# Patient Record
Sex: FEMALE | Race: WHITE
[De-identification: names, ages, dates, MRNs, and addresses within clinical notes are randomized per-mention and may not be internally consistent; named-entity substitution may affect disease eponyms.]

---
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
## 2022-01-25 ENCOUNTER — HOSPITAL ENCOUNTER (EMERGENCY)
Dept: HOSPITAL 75 - ER | Age: 22
Discharge: HOME | End: 2022-01-25
Payer: MEDICAID

## 2022-01-25 VITALS — WEIGHT: 169.98 LBS | BODY MASS INDEX: 27.65 KG/M2 | HEIGHT: 65.75 IN

## 2022-01-25 VITALS — SYSTOLIC BLOOD PRESSURE: 117 MMHG | DIASTOLIC BLOOD PRESSURE: 82 MMHG

## 2022-01-25 DIAGNOSIS — S90.122A: Primary | ICD-10-CM
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
## 2022-01-25 PROCEDURE — 73630 X-RAY EXAM OF FOOT: CPT

## 2022-01-25 NOTE — DIAGNOSTIC IMAGING REPORT
INDICATION: Pain.



FINDINGS: Three views of the left fourth toe showed no fracture,

dislocation, gas, foreign body, or abnormal periosteal reaction.

In particular, the fourth toe appeared unremarkable.



IMPRESSION: Unremarkable three-view left foot.



Dictated by: 



  Dictated on workstation # [id number]

## 2022-01-25 NOTE — ED LOWER EXTREMITY
General


Chief Complaint:  Lower Extremity


Stated Complaint:  LEFT FOOT 4 TOE INJURY


Nursing Triage Note:  


PT AMBULATORY TO ER, REPORTS WAS WALKING YESTERDAY AND ACCIDENTALLY KICKED THE 


CORNER OF THE WALL WITH HER L FOOT. PT C/O PAIN TO L 4TH TOE, BRUISING AND 


SWELLING NOTED TO DORSAL ASPECT OF L FOOT.


Source:  patient


Exam Limitations:  no limitations





History of Present Illness


Date Seen by Provider:  2022


Time Seen by Provider:  17:33


Initial Comments


Patient is a 21-year-old female who presents ED with left fourth toe pain.  

Patient states she accidentally hit a wall yesterday.  She report immediate 

pain.  Pain hurt worse with walking or standing.  Denies taking thing for pain. 

Noted bruising this morning with continuous pain.  Pain located to the fourth 

proximal toe.  No obvious bone deformity.  No history of previous fracture.





Allergies and Home Medications


Allergies


Coded Allergies:  


     No Known Drug Allergies (Unverified , 10/13/18)





Patient Home Medication List


Home Medication List Reviewed:  Yes


Docusate Sodium (Dok) 100 Mg Capsule, 100 MG PO BID PRN for CONSTIPATION-1ST 

LINE


   Prescribed by: NILSON MEDINA on 10/26/20 1224


Hydrocodone/Acetaminophen (Hydrocodone-Acetamin 5-325 mg) 1 Each Tablet, 1-2 TAB

PO Q6HR PRN for PAIN-MODERATE (5-7)


   Prescribed by: NILSON MEDINA on 10/26/20 1224


Ibuprofen (Ibu) 600 Mg Tablet, 600 MG PO Q6HR


   Prescribed by: NILSON MEDINA on 10/26/20 1224


Prenatal Vit W-Ca,Fe,FA(<1 mg) (Prenatal Vitamins) 1 Each Tablet, 1 EACH PO 

DAILY, (Reported)


   Entered as Reported by: SHWETA SENA on 20 1621





Review of Systems


Constitutional:  No chills, No dizziness, No fever, No malaise


EENTM:  No eye pain, No mouth pain, No mouth swelling, No nose pain, No throat 

pain


Respiratory:  No cough, No dyspnea on exertion, No short of breath


Cardiovascular:  No chest pain


Gastrointestinal:  No abdominal pain, No diarrhea, No nausea, No vomiting


Genitourinary:  No decreased output, No discharge


Musculoskeletal:  No back pain, No joint pain


Skin:  change in color, rash





All Other Systems Reviewed


Negative Unless Noted:  Yes





Past Medical-Social-Family Hx


Patient Social History


Tobacco Use?:  No


Use of E-Cig and/or Vaping dev:  No


Substance use?:  No


Alcohol Use?:  No


Pt feels they are or have been:  No





Immunizations Up To Date


Tetanus Booster (TDap):  Less than 5yrs


PED Vaccines UTD:  Yes





Seasonal Allergies


Seasonal Allergies:  No





Past Medical History


Surgeries:  Yes


 Section


Respiratory:  No


Cardiac:  No


Neurological:  No


Last Menstrual Period:  Dec 5, 2021


Reproductive Disorders:  No


Genitourinary:  Yes


Kidney Stones


Gastrointestinal:  Yes (Cyst by liver/Gallbladder )


Pancreatitis


Musculoskeletal:  No


Endocrine:  No


HEENT:  No


Cancer:  No


Psychosocial:  Yes


Anxiety


Integumentary:  No


Blood Disorders:  No


Adverse Reaction/Blood Tranf:  No





Family Medical History





Patient reports no known family medical history.


No Pertinent Family Hx, Heart Disease, Cancer, Diabetes





Physical Exam


Vital Signs





Vital Signs - First Documented








 22





 17:23


 


Temp 35.4


 


Pulse 80


 


Resp 20


 


B/P (MAP) 129/86 (100)


 


Pulse Ox 95


 


O2 Delivery Room Air





Capillary Refill :


Height, Weight, BMI


Height: 5'5.00"


Weight: 175lbs. oz. 79.882073sf; 27.00 BMI


Method:Estimated


General Appearance:  WD/WN, no apparent distress


HEENT:  PERRL/EOMI, normal ENT inspection, TMs normal, pharynx normal


Neck:  non-tender, full range of motion, supple, normal inspection


Cardiovascular:  regular rate, rhythm, no edema, no gallop, no JVD, no murmur


Respiratory:  chest non-tender, lungs clear, normal breath sounds, no 

respiratory distress, no accessory muscle use


Gastrointestinal:  normal bowel sounds, non tender, soft, no organomegaly


Back:  normal inspection, no CVA tenderness


Hips:  bilateral hip non-tender, bilateral hip normal inspection, bilateral hip 

no evidence of injury


Feet:  left foot pain, left foot soft tissue tenderness, left foot swelling


Skin:  other (Bruising to the left proximal fourth toe.  Neurovascular intact.  

No obvious bone deformity.)





Progress/Results/Core Measures


Results/Orders


My Orders





Orders - LUC FRYE


Foot, Left, 3 Views (22 17:31)





Vital Signs/I&O











 22





 17:23 17:57


 


Temp 35.4 


 


Pulse 80 79


 


Resp 20 18


 


B/P (MAP) 129/86 (100) 117/82


 


Pulse Ox 95 98


 


O2 Delivery Room Air Room Air














Blood Pressure Mean:                    100











Departure


Communication (Admissions)


X-ray was negative for fracture.  Able to stand and bear weight.  Bruising noted

to the fourth digit.  Recommend ice and anti-inflammatories.  Orthopedic 

outpatient follow-up.





Impression





   Primary Impression:  


   Foot pain


Disposition:   HOME, SELF-CARE


Condition:  Stable





Departure-Patient Inst.


Decision time for Depature:  17:48


Referrals:  


[facility] [place]


Primary Care Physician


Patient Instructions:  Foot Sprain (DC)


Work/School Note:  Work Release Form   Date Seen in the Emergency Department:  

2022


   Return to Work:  2022











LUC FRYE          2022 17:35

## 2022-03-28 ENCOUNTER — HOSPITAL ENCOUNTER (EMERGENCY)
Dept: HOSPITAL 75 - ER | Age: 22
Discharge: HOME | End: 2022-03-28
Payer: COMMERCIAL

## 2022-03-28 VITALS — SYSTOLIC BLOOD PRESSURE: 128 MMHG | DIASTOLIC BLOOD PRESSURE: 78 MMHG

## 2022-03-28 DIAGNOSIS — R51.9: Primary | ICD-10-CM

## 2022-03-28 PROCEDURE — 99282 EMERGENCY DEPT VISIT SF MDM: CPT
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
## 2023-05-27 ENCOUNTER — HOSPITAL ENCOUNTER (EMERGENCY)
Dept: HOSPITAL 75 - ER | Age: 23
Discharge: HOME | End: 2023-05-27
Payer: MEDICAID

## 2023-05-27 VITALS — BODY MASS INDEX: 28.91 KG/M2 | WEIGHT: 179.9 LBS | HEIGHT: 66.02 IN

## 2023-05-27 VITALS — DIASTOLIC BLOOD PRESSURE: 87 MMHG | SYSTOLIC BLOOD PRESSURE: 123 MMHG

## 2023-05-27 DIAGNOSIS — O20.9: Primary | ICD-10-CM

## 2023-05-27 DIAGNOSIS — Z28.310: ICD-10-CM

## 2023-05-27 DIAGNOSIS — Z3A.01: ICD-10-CM

## 2023-05-27 LAB
ALBUMIN SERPL-MCNC: 4 GM/DL (ref 3.2–4.5)
ALP SERPL-CCNC: 70 U/L (ref 40–136)
ALT SERPL-CCNC: 21 U/L (ref 0–55)
APTT PPP: YELLOW S
BACTERIA #/AREA URNS HPF: (no result) /HPF
BASOPHILS # BLD AUTO: 0 10^3/UL (ref 0–0.1)
BASOPHILS NFR BLD AUTO: 0 % (ref 0–10)
BILIRUB SERPL-MCNC: 0.3 MG/DL (ref 0.1–1)
BILIRUB UR QL STRIP: NEGATIVE
BUN/CREAT SERPL: 9
CALCIUM SERPL-MCNC: 9.1 MG/DL (ref 8.5–10.1)
CHLORIDE SERPL-SCNC: 109 MMOL/L (ref 98–107)
CO2 SERPL-SCNC: 21 MMOL/L (ref 21–32)
CREAT SERPL-MCNC: 0.67 MG/DL (ref 0.6–1.3)
EOSINOPHIL # BLD AUTO: 0.2 10^3/UL (ref 0–0.3)
EOSINOPHIL NFR BLD AUTO: 1 % (ref 0–10)
FIBRINOGEN PPP-MCNC: (no result) MG/DL
GFR SERPLBLD BASED ON 1.73 SQ M-ARVRAT: 127 ML/MIN
GLUCOSE SERPL-MCNC: 105 MG/DL (ref 70–105)
GLUCOSE UR STRIP-MCNC: NEGATIVE MG/DL
HCT VFR BLD CALC: 40 % (ref 35–52)
HGB BLD-MCNC: 14.5 G/DL (ref 11.5–16)
KETONES UR QL STRIP: NEGATIVE
LEUKOCYTE ESTERASE UR QL STRIP: NEGATIVE
LYMPHOCYTES # BLD AUTO: 2 10^3/UL (ref 1–4)
LYMPHOCYTES NFR BLD AUTO: 17 % (ref 12–44)
MANUAL DIFFERENTIAL PERFORMED BLD QL: NO
MCH RBC QN AUTO: 30 PG (ref 25–34)
MCHC RBC AUTO-ENTMCNC: 36 G/DL (ref 32–36)
MCV RBC AUTO: 82 FL (ref 80–99)
MONOCYTES # BLD AUTO: 0.7 10^3/UL (ref 0–1)
MONOCYTES NFR BLD AUTO: 6 % (ref 0–12)
NEUTROPHILS # BLD AUTO: 8.5 10^3/UL (ref 1.8–7.8)
NEUTROPHILS NFR BLD AUTO: 75 % (ref 42–75)
NITRITE UR QL STRIP: NEGATIVE
PH UR STRIP: 6.5 [PH] (ref 5–9)
PLATELET # BLD: 377 10^3/UL (ref 130–400)
PMV BLD AUTO: 10.3 FL (ref 9–12.2)
POTASSIUM SERPL-SCNC: 3.8 MMOL/L (ref 3.6–5)
PROT SERPL-MCNC: 7.2 GM/DL (ref 6.4–8.2)
PROT UR QL STRIP: NEGATIVE
RBC #/AREA URNS HPF: (no result) /HPF
SODIUM SERPL-SCNC: 139 MMOL/L (ref 135–145)
SP GR UR STRIP: 1.01 (ref 1.02–1.02)
SQUAMOUS #/AREA URNS HPF: (no result) /HPF
WBC # BLD AUTO: 11.3 10^3/UL (ref 4.3–11)
WBC #/AREA URNS HPF: (no result) /HPF

## 2023-05-27 PROCEDURE — 84703 CHORIONIC GONADOTROPIN ASSAY: CPT

## 2023-05-27 PROCEDURE — 81000 URINALYSIS NONAUTO W/SCOPE: CPT

## 2023-05-27 PROCEDURE — 85025 COMPLETE CBC W/AUTO DIFF WBC: CPT

## 2023-05-27 PROCEDURE — 76801 OB US < 14 WKS SINGLE FETUS: CPT

## 2023-05-27 PROCEDURE — 87591 N.GONORRHOEAE DNA AMP PROB: CPT

## 2023-05-27 PROCEDURE — 87491 CHLMYD TRACH DNA AMP PROBE: CPT

## 2023-05-27 PROCEDURE — 76817 TRANSVAGINAL US OBSTETRIC: CPT

## 2023-05-27 PROCEDURE — 80053 COMPREHEN METABOLIC PANEL: CPT

## 2023-05-27 PROCEDURE — 84702 CHORIONIC GONADOTROPIN TEST: CPT

## 2023-05-27 PROCEDURE — 36415 COLL VENOUS BLD VENIPUNCTURE: CPT

## 2023-05-27 PROCEDURE — 87210 SMEAR WET MOUNT SALINE/INK: CPT

## 2023-05-27 NOTE — ED GU-FEMALE
General


Stated Complaint:  7 WEEKS PREGNANT BLEEDING


Source:  patient


Exam Limitations:  no limitations





History of Present Illness


Date Seen by Provider:  May 27, 2023


Time Seen by Provider:  17:44


Initial Comments


23-year-old G2, P1 pregnant female presents to the ER with complaints of small 

amount of vaginal bleeding starting today.  She states that it started out as 

pink with wiping, then turned to a brighter red color.  Reports this occurred 4 

separate times when wiping.  She states that the last time she went to the 

bathroom there was no blood.  She is also complaining of left lower quadrant 

abdominal pain which is intermittent.  She denies fevers, vomiting, vaginal 

discharge, dysuria.  Thinks her last menstrual cycle was on 2023.  She has 

not yet had an ultrasound.  She recently scheduled a appointment with Dr. Marti.





Allergies and Home Medications


Allergies


Coded Allergies:  


     No Known Drug Allergies (Unverified , 10/13/18)





Patient Home Medication List


Home Medication List Reviewed:  Yes


Docusate Sodium (Dok) 100 Mg Capsule, 100 MG PO BID PRN for CONSTIPATION-1ST 

LINE


   Prescribed by: NILSON MARTI on 10/26/20 1224


Hydrocodone/Acetaminophen (Hydrocodone-Acetamin 5-325 mg) 1 Each Tablet, 1-2 TAB

PO Q6HR PRN for PAIN-MODERATE (5-7)


   Prescribed by: NILSON MARTI on 10/26/20 1224


Ibuprofen (Ibu) 600 Mg Tablet, 600 MG PO Q6HR


   Prescribed by: NILSON MARTI on 10/26/20 1224


Metronidazole (Metronidazole) 500 Mg Tablet, 500 MG PO BID


   Prescribed by: Dilia Ca on 23


Prenatal Vit W-Ca,Fe,FA(<1 mg) (Prenatal Vitamins) 1 Each Tablet, 1 EACH PO 

DAILY, (Reported)


   Entered as Reported by: SHWETA SENA on 20 1621





Review of Systems


Review of Systems


Constitutional:  see HPI





Past Medical-Social-Family Hx


Immunizations Up To Date


Tetanus Booster (TDap):  Less than 5yrs


PED Vaccines UTD:  Yes





Seasonal Allergies


Seasonal Allergies:  No





Past Medical History


Surgeries:  Yes


 Section


Respiratory:  No


Cardiac:  No


Neurological:  No


Reproductive Disorders:  No


Genitourinary:  Yes


Kidney Stones


Gastrointestinal:  Yes (Cyst by liver/Gallbladder )


Pancreatitis


Musculoskeletal:  No


Endocrine:  No


HEENT:  No


Cancer:  No


Psychosocial:  Yes


Anxiety


Integumentary:  No


Blood Disorders:  No


Adverse Reaction/Blood Tranf:  No





Family Medical History





Patient reports no known family medical history.


No Pertinent Family Hx, Heart Disease, Cancer, Diabetes





Physical Exam


Vital Signs





Vital Signs - First Documented








 23





 17:49 20:21


 


Temp 35.8 


 


Pulse 91 


 


Resp 20 


 


B/P (MAP) 133/85 (101) 


 


Pulse Ox  100


 


O2 Delivery Room Air 





Capillary Refill :


Height, Weight, BMI


Height: 5'5.00"


Weight: 175lbs. oz. 79.252434yp; 27.00 BMI


Method:Estimated


General Appearance:  WD/WN, no apparent distress


Neck:  supple, normal inspection


Cardiovascular:  regular rate, rhythm


Respiratory:  lungs clear, normal breath sounds, no respiratory distress, no 

accessory muscle use


Pelvic:  normal external exam, normal adnexa, no cerv. motion tender, no masses,

vaginal bleeding (Scant amount from cervix)


Extremities:  normal range of motion, normal inspection


Neurologic/Psychiatric:  alert, normal mood/affect


Skin:  normal color, warm/dry





Progress/Results/Core Measures


Suspected Sepsis


SIRS


Temperature: 


Pulse:  


Respiratory Rate: 


 


Blood Pressure  / 


Mean: 


 











Results/Orders


Lab Results





My Orders





Vital Signs/I&O


Capillary Refill :


Progress Note :  


Progress Note


Patient seen and evaluated, resting comfortably on bed, no acute distress.  

Based on exam and symptoms, work-up initiated including CBC, CMP, Rh factor, 

hCG, UA, urine pregnancy, OB ultrasound.  We will perform pelvic exam and 

obtained wet prep and gonorrhea and chlamydia swabs as well.





Labs reviewed. CBC shows slightly elevated WBC 11.3.  CMP shows slightly 

elevated chloride 109.  Otherwise grossly normal.  hCG is 12,050.  UA shows 3+ 

RBCs, 0-2 WBCs, few bacteria.  I do not think this is a UTI.  Patient is Rh+.  

Does not require RhoGAM.  Pelvic exam completed.  Bleeding from cervix.  Waiting

for radiology report of ultrasound.  Wet mount shows trace to few clue cells, 

negative for WBCs, trichomonas and yeast. 





Ultrasound shows gestational sac at 6 weeks and 4 days inside the uterus without

evidence of any acute abnormality.  They were unable to count the heart beat due

to fetal movement.  Results discussed with patient.  Discharge instructions and 

return precautions provided.





Diagnostic Imaging





   Diagonstic Imaging:  Ultrasound


   Plain Films/CT/US/NM/MRI:  pelvis


Comments


                 [facility]


                                [place], [place]





NAME:   ASHLEY GALLEGOS


MED REC#:   [id number]


ACCOUNT#:   [id number]


PT STATUS:   REG ER


:   2000


PHYSICIAN:   DILIA CA


ADMIT DATE:   23/ER


                                   ***Draft***


Date of Exam:23





US OB<14 WKS SNGLE W/TRANSVAG








INDICATION: Vaginal bleeding during early pregnancy.





EXAMINATION: OB transvaginal ultrasound.





FINDINGS: Ultrasonography reveals castro intrauterine


gestation. Fetal pole is present with crown-rump length of 0.6


cm. Yolk sac is present. No definite carla-gestational hematoma is


identified. No maternal adnexal region abnormality was


documented. Fetal heart rate could not be measured due to


maternal motion.





IMPRESSION: Gestational sac indicating gestational age of 6 weeks


and 4 days is seen in the uterus without evidence of acute


abnormality. Follow-up is recommended. 





  Dictated on workstation # [id number]








Dict:   23


Trans:   23


[facility] [id number]





Interpreted by:     SYLVIA MORRIS MD


Electronically signed by:





Departure


Impression





   Primary Impression:  


   Vaginal bleeding affecting early pregnancy


Disposition:   HOME, SELF-CARE


Condition:  Stable





Departure-Patient Inst.


Decision time for Depature:  20:14


Referrals:  


[facility] [place] [facility] ([facility]Family)


Primary Care Physician


Patient Instructions:  Bacterial Vaginosis (DC), Bleeding In Early Pregnancy





Add. Discharge Instructions:  


Complete full course of antibiotic as directed.


Call Dr. Marti's office on Tuesday to schedule a follow-up appointment.


Pelvic rest until you follow-up with Dr. Marti, nothing in the vagina.


Take it easy until you see Dr. Marti, normal household activities are fine, but

no strenuous activity.


Return for increase in bleeding, increase in pain, or any other new, concerning,

or worsening symptoms.


Scripts


Metronidazole (Metronidazole) 500 Mg Tablet


500 MG PO BID for 7 Days, #13 TAB 0 Refills


   Prov: DILIA CA         23











DILIA CA        May 27, 2023 18:03

## 2023-05-27 NOTE — DIAGNOSTIC IMAGING REPORT
INDICATION: Vaginal bleeding during early pregnancy.



EXAMINATION: OB transvaginal ultrasound.



FINDINGS: Ultrasonography reveals castro intrauterine

gestation. Fetal pole is present with crown-rump length of 0.6

cm. Yolk sac is present. No definite carla-gestational hematoma is

identified. No maternal adnexal region abnormality was

documented. Fetal heart rate could not be measured due to

maternal motion.



IMPRESSION: Gestational sac indicating gestational age of 6 weeks

and 4 days is seen in the uterus without evidence of acute

abnormality. Follow-up is recommended. 



Dictated by: 



  Dictated on workstation # [id number]

## 2023-08-08 ENCOUNTER — HOSPITAL ENCOUNTER (EMERGENCY)
Dept: HOSPITAL 75 - ER | Age: 23
Discharge: HOME | End: 2023-08-08
Payer: MEDICAID

## 2023-08-08 VITALS — SYSTOLIC BLOOD PRESSURE: 131 MMHG | DIASTOLIC BLOOD PRESSURE: 93 MMHG

## 2023-08-08 VITALS — WEIGHT: 174.83 LBS | BODY MASS INDEX: 29.13 KG/M2 | HEIGHT: 64.96 IN

## 2023-08-08 DIAGNOSIS — Z3A.17: ICD-10-CM

## 2023-08-08 DIAGNOSIS — O20.0: Primary | ICD-10-CM

## 2023-08-08 LAB
ALBUMIN SERPL-MCNC: 3.6 GM/DL (ref 3.2–4.5)
ALP SERPL-CCNC: 122 U/L (ref 40–136)
ALT SERPL-CCNC: 10 U/L (ref 0–55)
APTT BLD: 34 SEC (ref 24–35)
APTT PPP: YELLOW S
BACTERIA #/AREA URNS HPF: (no result) /HPF
BASOPHILS # BLD AUTO: 0 10^3/UL (ref 0–0.1)
BASOPHILS NFR BLD AUTO: 0 % (ref 0–10)
BILIRUB SERPL-MCNC: 0.2 MG/DL (ref 0.1–1)
BILIRUB UR QL STRIP: NEGATIVE
BUN/CREAT SERPL: 11
CALCIUM SERPL-MCNC: 9.4 MG/DL (ref 8.5–10.1)
CHLORIDE SERPL-SCNC: 108 MMOL/L (ref 98–107)
CO2 SERPL-SCNC: 19 MMOL/L (ref 21–32)
CREAT SERPL-MCNC: 0.55 MG/DL (ref 0.6–1.3)
EOSINOPHIL # BLD AUTO: 0.3 10^3/UL (ref 0–0.3)
EOSINOPHIL NFR BLD AUTO: 2 % (ref 0–10)
FIBRINOGEN PPP-MCNC: (no result) MG/DL
GFR SERPLBLD BASED ON 1.73 SQ M-ARVRAT: 134 ML/MIN
GLUCOSE SERPL-MCNC: 91 MG/DL (ref 70–105)
GLUCOSE UR STRIP-MCNC: NEGATIVE MG/DL
HCT VFR BLD CALC: 37 % (ref 35–52)
HGB BLD-MCNC: 13.2 G/DL (ref 11.5–16)
INR PPP: 1 (ref 0.8–1.4)
KETONES UR QL STRIP: NEGATIVE
LEUKOCYTE ESTERASE UR QL STRIP: NEGATIVE
LYMPHOCYTES # BLD AUTO: 2 10^3/UL (ref 1–4)
LYMPHOCYTES NFR BLD AUTO: 17 % (ref 12–44)
MANUAL DIFFERENTIAL PERFORMED BLD QL: NO
MCH RBC QN AUTO: 29 PG (ref 25–34)
MCHC RBC AUTO-ENTMCNC: 36 G/DL (ref 32–36)
MCV RBC AUTO: 82 FL (ref 80–99)
MONOCYTES # BLD AUTO: 0.7 10^3/UL (ref 0–1)
MONOCYTES NFR BLD AUTO: 6 % (ref 0–12)
NEUTROPHILS # BLD AUTO: 8.7 10^3/UL (ref 1.8–7.8)
NEUTROPHILS NFR BLD AUTO: 74 % (ref 42–75)
NITRITE UR QL STRIP: NEGATIVE
PH UR STRIP: 7 [PH] (ref 5–9)
PLATELET # BLD: 314 10^3/UL (ref 130–400)
PMV BLD AUTO: 10.1 FL (ref 9–12.2)
POTASSIUM SERPL-SCNC: 3.8 MMOL/L (ref 3.6–5)
PROT SERPL-MCNC: 7.1 GM/DL (ref 6.4–8.2)
PROT UR QL STRIP: NEGATIVE
PROTHROMBIN TIME: 13.1 SEC (ref 12.2–14.7)
SODIUM SERPL-SCNC: 136 MMOL/L (ref 135–145)
SP GR UR STRIP: 1.01 (ref 1.02–1.02)
SQUAMOUS #/AREA URNS HPF: (no result) /HPF
WBC # BLD AUTO: 11.8 10^3/UL (ref 4.3–11)
WBC #/AREA URNS HPF: (no result) /HPF

## 2023-08-08 PROCEDURE — 87491 CHLMYD TRACH DNA AMP PROBE: CPT

## 2023-08-08 PROCEDURE — 84702 CHORIONIC GONADOTROPIN TEST: CPT

## 2023-08-08 PROCEDURE — 36415 COLL VENOUS BLD VENIPUNCTURE: CPT

## 2023-08-08 PROCEDURE — 85025 COMPLETE CBC W/AUTO DIFF WBC: CPT

## 2023-08-08 PROCEDURE — 85610 PROTHROMBIN TIME: CPT

## 2023-08-08 PROCEDURE — 81000 URINALYSIS NONAUTO W/SCOPE: CPT

## 2023-08-08 PROCEDURE — 85730 THROMBOPLASTIN TIME PARTIAL: CPT

## 2023-08-08 PROCEDURE — 87210 SMEAR WET MOUNT SALINE/INK: CPT

## 2023-08-08 PROCEDURE — 87591 N.GONORRHOEAE DNA AMP PROB: CPT

## 2023-08-08 PROCEDURE — 80053 COMPREHEN METABOLIC PANEL: CPT

## 2023-08-08 NOTE — ED ABDOMINAL PAIN
General


Chief Complaint:  OB < 20 WEEKS


Stated Complaint:  17 WKS PREG- BLEEDING


Nursing Triage Note:  


PT AMB TO [facility] WITH CC OF VAGINAL BLEEDING X 2DAYS. PT RPORTS 17WEEKS PREG. PT 


STATES CONTACTED OB AND SENT TO ED. PT DENIES PAIN AT THIS TIME. PT REPORTS DARK




BLOOD CLOTS AND CHANGING PAD [id number].


Source of Information:  Patient


Exam Limitations:  No Limitations


 (LUC FRYE)





History of Present Illness


Date Seen by Provider:  Aug 8, 2023


Time Seen by Provider:  17:33


Initial Comments


Patient is a 23-year-old female who is G2, P1 currently 17 weeks pregnant who 

presents ED with vaginal bleeding.  She reports about a month and a half ago she

had a month of light spotting.  She states for about 2 weeks to stop.  Over the 

past 2 days she starting to pass larger clots.  Today she noted large clots when

while changing her pad.  She does report some left lower quad abdominal pain 

throughout the pregnancy described as sharp intermittent seems to be worse with 

movement.  No specific pain at this time.  She has no vaginal discharge, 

dysuria, hematuria, fever, chills.  She contacted her OB/GYN Dr. Marti who 

recommended come to the ED.  On arrival fetal cardiac activity 166 bpm.  She is 

currently O+.  She is currently on prenatals.  Patient denies nausea, vomit, 

diarrhea, chest pain, shortness of breath.  Patient states she had sex 

intercourse 3 to 4 days ago. 


 (LUC FRYE)





Allergies and Home Medications


Allergies


Coded Allergies:  


     No Known Drug Allergies (Unverified , 10/13/18)





Patient Home Medication List


Home Medication List Reviewed:  Yes


 (LUC FRYE)


Docusate Sodium (Dok) 100 Mg Capsule, 100 MG PO BID PRN for CONSTIPATION-1ST 

LINE


   Prescribed by: NILSON MARTI on 10/26/20 1224


Hydrocodone/Acetaminophen (Hydrocodone-Acetamin 5-325 mg) 1 Each Tablet, 1-2 TAB

PO Q6HR PRN for PAIN-MODERATE (5-7)


   Prescribed by: NILSON MARTI on 10/26/20 1224


Ibuprofen (Ibu) 600 Mg Tablet, 600 MG PO Q6HR


   Prescribed by: NILSON MARTI on 10/26/20 1224


Metronidazole (Metronidazole) 500 Mg Tablet, 500 MG PO BID


   Prescribed by: Dilia Santa on 23


Prenatal Vit W-Ca,Fe,FA(<1 mg) (Prenatal Vitamins) 1 Each Tablet, 1 EACH PO 

DAILY, (Reported)


   Entered as Reported by: SHWETA SENA on 20 1621





Review of Systems


Review of Systems


Constitutional:  No chills, No diaphoresis, No weakness


EENTM:  No Blurred Vision, No Double Vision, No Eye Pain


Respiratory:  Denies Cough, Denies Orthopnea


Cardiovascular:  Denies Chest Pain


Gastrointestinal:  Abdominal Pain; Denies Diarrhea, Denies Nausea, Denies 

Vomiting


Genitourinary:  Denies Burning, Denies Discharge, Denies Drainage, Denies 

Frequency; Other (Vaginal bleeding)


Musculoskeletal:  No back pain, No joint pain


Skin:  No change in color, No change in hair/nails


Psychiatric/Neurological:  Denies Anxiety, Denies Depressed (LUC FRYE)





All Other Systems Reviewed


Negative Unless Noted:  Yes


 (LUC FRYE)





Past Medical-Social-Family Hx


Patient Social History


Tobacco Use?:  No


Substance use?:  Yes


Substance type:  Marijuana


Substance frequency:  Once in a while


Alcohol Use?:  No


Pt feels they are or have been:  Unable to obtain


 (LUC FRYE)





Immunizations Up To Date


Tetanus Booster (TDap):  Less than 5yrs


PED Vaccines UTD:  Yes


 (LUC FRYE)





Seasonal Allergies


Seasonal Allergies:  No


 (LUC FRYE)





Past Medical History


Surgeries:  Yes


 Section


Respiratory:  No


Cardiac:  No


Neurological:  No


Reproductive Disorders:  No


Genitourinary:  Yes


Kidney Stones


Gastrointestinal:  Yes (Cyst by liver/Gallbladder )


Pancreatitis


Musculoskeletal:  No


Endocrine:  No


HEENT:  No


Cancer:  No


Psychosocial:  Yes


Anxiety


Integumentary:  No


Blood Disorders:  No


Adverse Reaction/Blood Tranf:  No


 (LUC FRYE)





Family Medical History





Patient reports no known family medical history.


No Pertinent Family Hx, Heart Disease, Cancer, Diabetes


 (LUC FRYE)





Physical Exam


Vital Signs





Vital Signs - First Documented








 23





 17:08


 


Pulse 96


 


Resp 18


 


B/P (MAP) 131/93 (106)


 


Pulse Ox 99


 


O2 Delivery Room Air








 (CELINA MAGALLANES MD)


Vital Signs


Capillary Refill : Less Than 3 Seconds 


 (LUC FRYE)


Height/Weight/BMI


Height: 5'5.00"


Weight: 175lbs. oz. [id number]; 29.00 BMI


Method:Estimated


General Appearance:  WD/WN, no apparent distress


HEENT:  PERRL/EOMI, normal ENT inspection, TMs normal, pharynx normal


Neck:  non-tender, full range of motion, supple


Respiratory:  chest non-tender, lungs clear, normal breath sounds, no 

respiratory distress, no accessory muscle use


Cardiovascular:  regular rate, rhythm, no edema, no gallop, no JVD


Gastrointestinal:  normal bowel sounds, non tender, soft, no organomegaly


Genital/Rectal:  other (Mild clots noted around the vaginal vault.  No active 

gross bleeding.  Cervix appears closed with no active bleeding from the cervix.)


Extremities:  normal range of motion, non-tender, normal inspection, no pedal 

edema


Back:  normal inspection, no CVA tenderness, no vertebral tenderness


Neurologic/Psychiatric:  CNs II-XII nml as tested, no motor/sensory deficits, 

alert, normal mood/affect, oriented x 3


Skin:  normal color, warm/dry (LUC FRYE)





Progress/Results/Core Measures


Results/Orders


Lab Results





Laboratory Tests








Test


 23


17:16 23


17:36 23


18:37 Range/Units


 


 


White Blood Count


 11.8 H


 


 


 4.3-11.0


10^3/uL


 


Red Blood Count


 4.49 


 


 


 3.80-5.11


10^6/uL


 


Hemoglobin 13.2    11.5-16.0  g/dL


 


Hematocrit 37    35-52  %


 


Mean Corpuscular Volume 82    80-99  fL


 


Mean Corpuscular Hemoglobin 29    25-34  pg


 


Mean Corpuscular Hemoglobin


Concent 36 


 


 


 32-36  g/dL





 


Red Cell Distribution Width 13.2    10.0-14.5  %


 


Platelet Count


 314 


 


 


 130-400


10^3/uL


 


Mean Platelet Volume 10.1    9.0-12.2  fL


 


Immature Granulocyte % (Auto) 0     %


 


Neutrophils (%) (Auto) 74    42-75  %


 


Lymphocytes (%) (Auto) 17    12-44  %


 


Monocytes (%) (Auto) 6    0-12  %


 


Eosinophils (%) (Auto) 2    0-10  %


 


Basophils (%) (Auto) 0    0-10  %


 


Neutrophils # (Auto)


 8.7 H


 


 


 1.8-7.8


10^3/uL


 


Lymphocytes # (Auto)


 2.0 


 


 


 1.0-4.0


10^3/uL


 


Monocytes # (Auto)


 0.7 


 


 


 0.0-1.0


10^3/uL


 


Eosinophils # (Auto)


 0.3 


 


 


 0.0-0.3


10^3/uL


 


Basophils # (Auto)


 0.0 


 


 


 0.0-0.1


10^3/uL


 


Immature Granulocyte # (Auto)


 0.0 


 


 


 0.0-0.1


10^3/uL


 


Prothrombin Time 13.1    12.2-14.7  SEC


 


INR Comment 1.0    0.8-1.4  


 


Activated Partial


Thromboplast Time 34 


 


 


 24-35  SEC





 


Sodium Level 136    135-145  MMOL/L


 


Potassium Level 3.8    3.6-5.0  MMOL/L


 


Chloride Level 108 H     MMOL/L


 


Carbon Dioxide Level 19 L   21-32  MMOL/L


 


Anion Gap 9    5-14  MMOL/L


 


Blood Urea Nitrogen 6 L   7-18  MG/DL


 


Creatinine


 0.55 L


 


 


 0.60-1.30


MG/DL


 


Estimat Glomerular Filtration


Rate 134 


 


 


  





 


BUN/Creatinine Ratio 11     


 


Glucose Level 91      MG/DL


 


Calcium Level 9.4    8.5-10.1  MG/DL


 


Corrected Calcium 9.7    8.5-10.1  MG/DL


 


Total Bilirubin 0.2    0.1-1.0  MG/DL


 


Aspartate Amino Transf


(AST/SGOT) 11 


 


 


 5-34  U/L





 


Alanine Aminotransferase


(ALT/SGPT) 10 


 


 


 0-55  U/L





 


Alkaline Phosphatase 122      U/L


 


Total Protein 7.1    6.4-8.2  GM/DL


 


Albumin 3.6    3.2-4.5  GM/DL


 


Human Chorionic Gonadotropin,


Quant 22811 H


 


 


 <5  MIU/ML





 


Urine Color  YELLOW    


 


Urine Clarity


 


 SLIGHTLY


CLOUDY 


  





 


Urine pH  7.0   5-9  


 


Urine Specific Gravity  1.015 L  1.016-1.022  


 


Urine Protein  NEGATIVE   NEGATIVE  


 


Urine Glucose (UA)  NEGATIVE   NEGATIVE  


 


Urine Ketones  NEGATIVE   NEGATIVE  


 


Urine Nitrite  NEGATIVE   NEGATIVE  


 


Urine Bilirubin  NEGATIVE   NEGATIVE  


 


Urine Urobilinogen  0.2   < = 1.0  MG/DL


 


Urine Leukocyte Esterase  NEGATIVE   NEGATIVE  


 


Urine RBC (Auto)  3+ H  NEGATIVE  


 


Urine RBC  10-25 H   /HPF


 


Urine WBC  0-2    /HPF


 


Urine Squamous Epithelial


Cells 


 5-10 


 


  /HPF





 


Urine Crystals  NONE    /LPF


 


Urine Bacteria  TRACE    /HPF


 


Urine Casts  NONE    /LPF


 


Urine Mucus  NEGATIVE    /LPF


 


Urine Culture Indicated  NO    





 (CELINA MAGALLANES MD)


Vital Signs/I&O











 23





 17:08 19:15


 


Pulse 96 96


 


Resp 18 18


 


B/P (MAP) 131/93 (106) 131/93


 


Pulse Ox 99 99


 


O2 Delivery Room Air Room Air





 (CELINA MAGALLANES MD)








Blood Pressure Mean:                    106











Departure


Communication (PCP)


Reviewed previous ER visits, H&P, lab testing.  Differential diagnosis 

threatened miscarriage, PID, UTI.  Patient is 17 weeks pregnant.  Currently 

follows Dr. Marti.  Heavy vaginal bleeding over the past 2 days.  She did have 

some spotting about a month and a half ago that lasted for about a month.  

Eventually resolved for about 2 weeks.  She did have sexual intercourse 3 days 

ago.  On arrival patient no acute distress.  She reports some mild left lower 

abdominal pain throughout her pregnancy.  No vomiting fever, chills, body aches 

or urinary symptoms.  Due to her current complaint CBC, CMP, lipase, Rh, 

urinalysis and pelvic exam was ordered with wet mount.  Sexually active with 1 

partner.  CBC showed slight elevated white blood 11.8.  Chemistry grossly 

unremarkable.  Beta quant 41,000.  Pelvic exam did show some mild blood clots in

the vaginal vault.  Cervix appears closed.  Did have some mild mucus around the 

internal os but does not appear to be gross bleeding.  Fetal cardiac activity 

166 bpm.  She appears hemodynamically stable.  Urinalysis negative for infe

ction.  Wet mount unremarkable.  Chlamydia and gonorrhea cultures pending.  

Unlikely PID.  soft abdomen without any tenderness.  Patient was discussed with 

Dr. Marti her OB/GYN.  At this time he recommends pelvic rest.  Recommends 

contacting the office for further evaluation.  No sexual intercourse.  Since she

is not grossly bleeding with stable vitals and hemoglobin at this time okay for 

patient to be discharged.  Patient agrees with this plan of action.  Did discuss

admission for observation but since she is having no active profuse bleeding and

currently hemodynamically stable she will be discharged with strict return 

precautions.


 (LUC FRYE)





Impression





   Primary Impression:  


   Threatened miscarriage


Disposition:   HOME, SELF-CARE


Condition:  Stable





Departure-Patient Inst.


Decision time for Depature:  18:31


 (LUC FRYE)


Referrals:  


[facility] (PCP/Family)


Primary Care Physician








NILSON MARTI DO


Patient Instructions:  Bleeding in Early Pregnancy ED





Add. Discharge Instructions:  


Recommend pelvic rest for the next 2 or 3 days.  Recommend contacting your 

OB/GYN Dr. Marti tomorrow for further evaluation.  No sexual intercourse





All discharge instructions reviewed with patient and/or family. Voiced 

understanding.








ATTENDING PHYSICIAN NOTE:


I was physically present as attending physician in the emergency department 

during the care of this patient, but I was not directly involved in the decision

making or delivery of care for this patient. 


 (CELINA MAGALLANES MD)











LUC FRYE           Aug 8, 2023 17:35


CELINA MAGALLANES MD         Aug 9, 2023 00:47

## 2023-08-15 ENCOUNTER — HOSPITAL ENCOUNTER (EMERGENCY)
Dept: HOSPITAL 75 - ER | Age: 23
Discharge: HOME | End: 2023-08-15
Payer: MEDICAID

## 2023-08-15 VITALS — BODY MASS INDEX: 29.15 KG/M2 | WEIGHT: 179.24 LBS | HEIGHT: 65.75 IN

## 2023-08-15 VITALS — SYSTOLIC BLOOD PRESSURE: 110 MMHG | DIASTOLIC BLOOD PRESSURE: 67 MMHG

## 2023-08-15 DIAGNOSIS — Z3A.18: ICD-10-CM

## 2023-08-15 DIAGNOSIS — O20.9: Primary | ICD-10-CM

## 2023-08-15 DIAGNOSIS — Z28.310: ICD-10-CM

## 2023-08-15 LAB
APTT PPP: (no result) S
BACTERIA #/AREA URNS HPF: (no result) /HPF
BASOPHILS # BLD AUTO: 0 10^3/UL (ref 0–0.1)
BASOPHILS NFR BLD AUTO: 0 % (ref 0–10)
BILIRUB UR QL STRIP: NEGATIVE
BUN/CREAT SERPL: 9
CALCIUM SERPL-MCNC: 9.4 MG/DL (ref 8.5–10.1)
CHLORIDE SERPL-SCNC: 108 MMOL/L (ref 98–107)
CO2 SERPL-SCNC: 20 MMOL/L (ref 21–32)
CREAT SERPL-MCNC: 0.47 MG/DL (ref 0.6–1.3)
EOSINOPHIL # BLD AUTO: 0.2 10^3/UL (ref 0–0.3)
EOSINOPHIL NFR BLD AUTO: 1 % (ref 0–10)
FIBRINOGEN PPP-MCNC: (no result) MG/DL
GFR SERPLBLD BASED ON 1.73 SQ M-ARVRAT: 139 ML/MIN
GLUCOSE SERPL-MCNC: 73 MG/DL (ref 70–105)
GLUCOSE UR STRIP-MCNC: NEGATIVE MG/DL
HCT VFR BLD CALC: 36 % (ref 35–52)
HGB BLD-MCNC: 12.6 G/DL (ref 11.5–16)
KETONES UR QL STRIP: NEGATIVE
LEUKOCYTE ESTERASE UR QL STRIP: (no result)
LYMPHOCYTES # BLD AUTO: 2.3 10^3/UL (ref 1–4)
LYMPHOCYTES NFR BLD AUTO: 18 % (ref 12–44)
MANUAL DIFFERENTIAL PERFORMED BLD QL: NO
MCH RBC QN AUTO: 29 PG (ref 25–34)
MCHC RBC AUTO-ENTMCNC: 35 G/DL (ref 32–36)
MCV RBC AUTO: 82 FL (ref 80–99)
MONOCYTES # BLD AUTO: 0.6 10^3/UL (ref 0–1)
MONOCYTES NFR BLD AUTO: 5 % (ref 0–12)
NEUTROPHILS # BLD AUTO: 9.5 10^3/UL (ref 1.8–7.8)
NEUTROPHILS NFR BLD AUTO: 75 % (ref 42–75)
NITRITE UR QL STRIP: NEGATIVE
PH UR STRIP: 7 [PH] (ref 5–9)
PLATELET # BLD: 311 10^3/UL (ref 130–400)
PMV BLD AUTO: 10.2 FL (ref 9–12.2)
POTASSIUM SERPL-SCNC: 3.7 MMOL/L (ref 3.6–5)
PROT UR QL STRIP: (no result)
RBC #/AREA URNS HPF: (no result) /HPF
SODIUM SERPL-SCNC: 138 MMOL/L (ref 135–145)
SP GR UR STRIP: 1.02 (ref 1.02–1.02)
SQUAMOUS #/AREA URNS HPF: (no result) /HPF
WBC # BLD AUTO: 12.7 10^3/UL (ref 4.3–11)
WBC #/AREA URNS HPF: (no result) /HPF

## 2023-08-15 PROCEDURE — 87491 CHLMYD TRACH DNA AMP PROBE: CPT

## 2023-08-15 PROCEDURE — 36415 COLL VENOUS BLD VENIPUNCTURE: CPT

## 2023-08-15 PROCEDURE — 87210 SMEAR WET MOUNT SALINE/INK: CPT

## 2023-08-15 PROCEDURE — 81000 URINALYSIS NONAUTO W/SCOPE: CPT

## 2023-08-15 PROCEDURE — 87591 N.GONORRHOEAE DNA AMP PROB: CPT

## 2023-08-15 PROCEDURE — 85025 COMPLETE CBC W/AUTO DIFF WBC: CPT

## 2023-08-15 PROCEDURE — 80048 BASIC METABOLIC PNL TOTAL CA: CPT

## 2023-08-15 NOTE — ED GU-FEMALE
General


Chief Complaint:  OB < 20 WEEKS


Stated Complaint:  18 WEEKS PREG/ BLEEDING


Nursing Triage Note:  


PATIENT VERBALIZED SHE IS 18 WKS PREG AND IS ON BED REST DUE TO BLEEDING DURING 


THE PREGNANCY. PATIENT STATES SHE WAS SITTING DOWN TODAY DOING LAUNDRY AND WHEN 


SHE STOOD UP SHE HAS A "POOL" OF BLOOD, RUNNING DOWN LEG. PATIENT GOT NERVOUS 


AND CONTACTED OB UNIT AND WAS INSTRUCTED TO COME TO ED TO GET CHECKED OUT. 


PATIENT STATES SHE HAD A DR APPT AT [facility] YESTERDAY AND HEART RATE WAS 160S


Source:  patient


Exam Limitations:  no limitations





History of Present Illness


Date Seen by Provider:  Aug 15, 2023


Time Seen by Provider:  19:20


Initial Comments


23-year-old G2, P1 pregnant female who is currently 18 weeks pregnant presents 

to the ER with complaint of vaginal bleeding.  She states that she has been 

having bleeding throughout the whole pregnancy.  Today she had a gush of blood, 

that ran down both legs.  She states she was concerned because she never had 

this much bleeding at one time before.  She reports bleeding every day, last had

blood clots approximately 1 week ago.  She called Dr. Marti's office who told 

her to come to the ER today.  She denies fevers, diarrhea, dysuria.  Reports 

intermittent left lower quadrant and right lower quadrant abdominal pain.  She 

states that she has always had intermittent left-sided abdominal pain during 

this pregnancy, she is now having right-sided intermittent abdominal pain.  

Reports nausea and vomiting which is normal for her pregnancy.  She saw the OB 

nurse practitioner yesterday who told for her to take it easy.  She is scheduled

to have her 20-week ultrasound this coming Friday.





Allergies and Home Medications


Allergies


Coded Allergies:  


     No Known Drug Allergies (Unverified , 10/13/18)





Patient Home Medication List


Home Medication List Reviewed:  Yes


Docusate Sodium (Dok) 100 Mg Capsule, 100 MG PO BID PRN for CONSTIPATION-1ST 

LINE


   Prescribed by: NILSON MARTI on 10/26/20 1224


Hydrocodone/Acetaminophen (Hydrocodone-Acetamin 5-325 mg) 1 Each Tablet, 1-2 TAB

PO Q6HR PRN for PAIN-MODERATE (5-7)


   Prescribed by: NILSON MARTI on 10/26/20 1224


Ibuprofen (Ibu) 600 Mg Tablet, 600 MG PO Q6HR


   Prescribed by: NILSON MARTI on 10/26/20 1224


Metronidazole (Metronidazole) 500 Mg Tablet, 500 MG PO BID


   Prescribed by: Dilia Santa on 23


Prenatal Vit W-Ca,Fe,FA(<1 mg) (Prenatal Vitamins) 1 Each Tablet, 1 EACH PO 

DAILY, (Reported)


   Entered as Reported by: SHWETA SENA on 20 1621





Review of Systems


Review of Systems


Constitutional:  see HPI





Past Medical-Social-Family Hx


Patient Social History


Tobacco Use?:  No


Substance use?:  No


Alcohol Use?:  No





Immunizations Up To Date


Tetanus Booster (TDap):  Less than 5yrs


PED Vaccines UTD:  Yes





Seasonal Allergies


Seasonal Allergies:  No





Past Medical History


Surgeries:  Yes


 Section


Respiratory:  No


Cardiac:  No


Neurological:  No


Reproductive Disorders:  No


Genitourinary:  Yes


Kidney Stones


Gastrointestinal:  Yes (Cyst by liver/Gallbladder )


Pancreatitis


Musculoskeletal:  No


Endocrine:  No


HEENT:  No


Cancer:  No


Psychosocial:  Yes


Anxiety


Integumentary:  No


Blood Disorders:  No


Adverse Reaction/Blood Tranf:  No





Family Medical History





Patient reports no known family medical history.


No Pertinent Family Hx, Heart Disease, Cancer, Diabetes





Physical Exam


Vital Signs





Vital Signs - First Documented








 8/15/23





 19:21


 


Temp 37.1


 


Pulse 100


 


Resp 18


 


B/P (MAP) 136/91 (106)


 


Pulse Ox 99


 


O2 Delivery Room Air





Capillary Refill : Less Than 3 Seconds


Height, Weight, BMI


Height: 5'5.00"


Weight: 175lbs. oz. 79.923056rj; 29.00 BMI


Method:Estimated


General Appearance:  WD/WN, no apparent distress


Neck:  supple, normal inspection


Cardiovascular:  regular rate, rhythm


Respiratory:  lungs clear, normal breath sounds, no respiratory distress, no 

accessory muscle use


Gastrointestinal:  normal bowel sounds, non tender, soft


Extremities:  normal range of motion, normal inspection


Neurologic/Psychiatric:  alert, normal mood/affect


Skin:  normal color, warm/dry





Progress/Results/Core Measures


Suspected Sepsis


SIRS


Temperature: 


Pulse: 100 


Respiratory Rate: 18


 


Laboratory Tests


8/15/23 19:38: White Blood Count 12.7H


Blood Pressure 136 /91 


Mean: 106


 











Laboratory Tests


8/15/23 19:38: 


Creatinine 0.47L, Platelet Count 311





Results/Orders


Lab Results





Laboratory Tests








Test


 8/15/23


19:38 8/15/23


19:43 8/15/23


20:30 Range/Units


 


 


White Blood Count


 12.7 H


 


 


 4.3-11.0


10^3/uL


 


Red Blood Count


 4.33 


 


 


 3.80-5.11


10^6/uL


 


Hemoglobin 12.6    11.5-16.0  g/dL


 


Hematocrit 36    35-52  %


 


Mean Corpuscular Volume 82    80-99  fL


 


Mean Corpuscular Hemoglobin 29    25-34  pg


 


Mean Corpuscular Hemoglobin


Concent 35 


 


 


 32-36  g/dL





 


Red Cell Distribution Width 13.4    10.0-14.5  %


 


Platelet Count


 311 


 


 


 130-400


10^3/uL


 


Mean Platelet Volume 10.2    9.0-12.2  fL


 


Immature Granulocyte % (Auto) 1     %


 


Neutrophils (%) (Auto) 75    42-75  %


 


Lymphocytes (%) (Auto) 18    12-44  %


 


Monocytes (%) (Auto) 5    0-12  %


 


Eosinophils (%) (Auto) 1    0-10  %


 


Basophils (%) (Auto) 0    0-10  %


 


Neutrophils # (Auto)


 9.5 H


 


 


 1.8-7.8


10^3/uL


 


Lymphocytes # (Auto)


 2.3 


 


 


 1.0-4.0


10^3/uL


 


Monocytes # (Auto)


 0.6 


 


 


 0.0-1.0


10^3/uL


 


Eosinophils # (Auto)


 0.2 


 


 


 0.0-0.3


10^3/uL


 


Basophils # (Auto)


 0.0 


 


 


 0.0-0.1


10^3/uL


 


Immature Granulocyte # (Auto)


 0.1 


 


 


 0.0-0.1


10^3/uL


 


Sodium Level 138    135-145  MMOL/L


 


Potassium Level 3.7    3.6-5.0  MMOL/L


 


Chloride Level 108 H     MMOL/L


 


Carbon Dioxide Level 20 L   21-32  MMOL/L


 


Anion Gap 10    5-14  MMOL/L


 


Blood Urea Nitrogen 4 L   7-18  MG/DL


 


Creatinine


 0.47 L


 


 


 0.60-1.30


MG/DL


 


Estimat Glomerular Filtration


Rate 139 


 


 


  





 


BUN/Creatinine Ratio 9     


 


Glucose Level 73      MG/DL


 


Calcium Level 9.4    8.5-10.1  MG/DL


 


Urine Color  RED H   


 


Urine Clarity  CLOUDY    


 


Urine pH  7.0   5-9  


 


Urine Specific Gravity  1.020   1.016-1.022  


 


Urine Protein  TRACE H  NEGATIVE  


 


Urine Glucose (UA)  NEGATIVE   NEGATIVE  


 


Urine Ketones  NEGATIVE   NEGATIVE  


 


Urine Nitrite  NEGATIVE   NEGATIVE  


 


Urine Bilirubin  NEGATIVE   NEGATIVE  


 


Urine Urobilinogen  0.2   < = 1.0  MG/DL


 


Urine Leukocyte Esterase  1+ H  NEGATIVE  


 


Urine RBC (Auto)  3+ H  NEGATIVE  


 


Urine RBC   H   /HPF


 


Urine WBC  2-5    /HPF


 


Urine Squamous Epithelial


Cells 


 2-5 


 


  /HPF





 


Urine Crystals  NONE    /LPF


 


Urine Bacteria  TRACE    /HPF


 


Urine Casts  NONE    /LPF


 


Urine Mucus  NEGATIVE    /LPF


 


Urine Culture Indicated  NO    








My Orders





Orders - DILIA COKER


Cbc With Automated Diff (8/15/23 19:20)


Basic Metabolic Panel (8/15/23 19:20)


Ua Culture If Indicated (8/15/23 19:31)


Wet Prep (8/15/23 20:32)


Neisseria Gonorrhea Swab (8/15/23 20:32)


Chlamydia Trachomatis Swab (8/15/23 20:32)





Vital Signs/I&O











 8/15/23





 19:21


 


Temp 37.1


 


Pulse 100


 


Resp 18


 


B/P (MAP) 136/91 (106)


 


Pulse Ox 99


 


O2 Delivery Room Air





Capillary Refill : Less Than 3 Seconds








Blood Pressure Mean:                    106








Progress Note :  


Progress Note


Patient seen and evaluated, resting comfortably in bed, no acute distress.  

Based on exam and symptoms, differential diagnosis includes but not limited to 

miscarriage, UTI, PID, vaginitis, STD.  Work-up initiated including CBC, CMP, 

UA, hCG.  Will perform a pelvic exam and a wet prep.  Patient is Rh+.





Departure


Impression





   Primary Impression:  


   Vaginal bleeding before 22 weeks gestation


Disposition:  01 HOME, SELF-CARE


Condition:  Stable





Departure-Patient Inst.


Decision time for Depature:  21:01


Referrals:  


[facility] (PCP/Family)


Primary Care Physician


Patient Instructions:  Bleeding in Early Pregnancy ED





Add. Discharge Instructions:  


Bedrest for the next couple days.


Pelvic rest, do not insert anything into the vagina until bleeding stops.


Follow-up with Dr. Marti.


Go to your scheduled ultrasound this Friday.


Return for severe abdominal pain, significant amount of bleeding, if you are 

saturating a heavy menstrual pad every hour for several hours, if you develop 

dizziness, shortness of breath, chest pain, or any other new, concerning, or 

worsening symptoms.





All discharge instructions reviewed with patient and/or family. Voiced 

understanding.











DILIA COKER          Aug 15, 2023 19:42

## 2023-08-18 ENCOUNTER — HOSPITAL ENCOUNTER (OUTPATIENT)
Dept: HOSPITAL 75 - RAD | Age: 23
End: 2023-08-18
Payer: MEDICAID

## 2023-08-18 DIAGNOSIS — O41.02X0: ICD-10-CM

## 2023-08-18 DIAGNOSIS — Z3A.18: ICD-10-CM

## 2023-08-18 PROCEDURE — 76805 OB US >/= 14 WKS SNGL FETUS: CPT

## 2023-08-18 NOTE — DIAGNOSTIC IMAGING REPORT
INDICATION: Evaluation of normal pregnancy, anatomic survey,

vaginal bleeding



TECHNIQUE: Multiple real-time grayscale images were obtained over

the gravid uterus.



COMPARISON: None



FINDINGS: 

There is a single live intrauterine gestation in breech

presentation. The cervix is measured at approximately 3.2 cm. The

placenta is superior and to the maternal right, without previa.

The fetal heart rate is measured at 167 BPM. Images are

suboptimal due to low amniotic fluid and fetal positioning. No

fetal anatomy is well seen. The abdominal circumference is not

well evaluated.



Biometrical measurements are as follows:

Biparietal 3.98 cm, age 18 weeks 1 days.

Head circumference 14.90 cm, age 18 weeks 0 days.

Abdominal circumference NA cm, age NA weeks NA days.

Femur length 2.49 cm, age 17 weeks 4 days.



Sonographic estimate age: 18 weeks 0 days.

Sonographic estimated date of delivery: 01/19/2024.



Estimated Fetal Weight: NA gm (+/- NA  gm).

LMP percentile: NA%.



Fetal heart rate: 167 beats per minute.



Fetal number: 1 of 1.



IMPRESSION: 

1. Single live intrauterine gestation measures at 18 weeks and 0

days on today's exam which is within range of the clinical dates.

2. Oligohydramnios, with an amniotic fluid index of 2.4 cm 

3. Anatomy is not well seen on today's exam. Recommend continued

follow-up.

4. Breech presentation.



Report left as a voicemail to GONZÁLEZ Gutiérrez, at 4:10 PM

08/18/2023/cb



Dictated by: 



  Dictated on workstation # [id number]

## 2023-08-21 ENCOUNTER — HOSPITAL ENCOUNTER (OUTPATIENT)
Dept: HOSPITAL 75 - ER | Age: 23
Setting detail: OBSERVATION
LOS: 1 days | Discharge: HOME | End: 2023-08-22
Attending: OBSTETRICS & GYNECOLOGY | Admitting: OBSTETRICS & GYNECOLOGY
Payer: MEDICAID

## 2023-08-21 VITALS — SYSTOLIC BLOOD PRESSURE: 113 MMHG | DIASTOLIC BLOOD PRESSURE: 73 MMHG

## 2023-08-21 VITALS — SYSTOLIC BLOOD PRESSURE: 98 MMHG | DIASTOLIC BLOOD PRESSURE: 53 MMHG

## 2023-08-21 VITALS — DIASTOLIC BLOOD PRESSURE: 67 MMHG | SYSTOLIC BLOOD PRESSURE: 119 MMHG

## 2023-08-21 VITALS — SYSTOLIC BLOOD PRESSURE: 93 MMHG | DIASTOLIC BLOOD PRESSURE: 54 MMHG

## 2023-08-21 VITALS — DIASTOLIC BLOOD PRESSURE: 79 MMHG | SYSTOLIC BLOOD PRESSURE: 128 MMHG

## 2023-08-21 VITALS — SYSTOLIC BLOOD PRESSURE: 110 MMHG | DIASTOLIC BLOOD PRESSURE: 74 MMHG

## 2023-08-21 VITALS — SYSTOLIC BLOOD PRESSURE: 111 MMHG | DIASTOLIC BLOOD PRESSURE: 68 MMHG

## 2023-08-21 VITALS — DIASTOLIC BLOOD PRESSURE: 77 MMHG | SYSTOLIC BLOOD PRESSURE: 114 MMHG

## 2023-08-21 VITALS — SYSTOLIC BLOOD PRESSURE: 119 MMHG | DIASTOLIC BLOOD PRESSURE: 56 MMHG

## 2023-08-21 VITALS — DIASTOLIC BLOOD PRESSURE: 74 MMHG | SYSTOLIC BLOOD PRESSURE: 128 MMHG

## 2023-08-21 VITALS — DIASTOLIC BLOOD PRESSURE: 65 MMHG | SYSTOLIC BLOOD PRESSURE: 103 MMHG

## 2023-08-21 VITALS — DIASTOLIC BLOOD PRESSURE: 76 MMHG | SYSTOLIC BLOOD PRESSURE: 114 MMHG

## 2023-08-21 VITALS — DIASTOLIC BLOOD PRESSURE: 57 MMHG | SYSTOLIC BLOOD PRESSURE: 123 MMHG

## 2023-08-21 VITALS — SYSTOLIC BLOOD PRESSURE: 128 MMHG | DIASTOLIC BLOOD PRESSURE: 74 MMHG

## 2023-08-21 VITALS — SYSTOLIC BLOOD PRESSURE: 118 MMHG | DIASTOLIC BLOOD PRESSURE: 77 MMHG

## 2023-08-21 VITALS — SYSTOLIC BLOOD PRESSURE: 120 MMHG | DIASTOLIC BLOOD PRESSURE: 72 MMHG

## 2023-08-21 VITALS — SYSTOLIC BLOOD PRESSURE: 92 MMHG | DIASTOLIC BLOOD PRESSURE: 53 MMHG

## 2023-08-21 VITALS — SYSTOLIC BLOOD PRESSURE: 123 MMHG | DIASTOLIC BLOOD PRESSURE: 76 MMHG

## 2023-08-21 VITALS — SYSTOLIC BLOOD PRESSURE: 123 MMHG | DIASTOLIC BLOOD PRESSURE: 57 MMHG

## 2023-08-21 VITALS — HEIGHT: 66.02 IN | WEIGHT: 194.01 LBS | BODY MASS INDEX: 31.18 KG/M2

## 2023-08-21 VITALS — DIASTOLIC BLOOD PRESSURE: 73 MMHG | SYSTOLIC BLOOD PRESSURE: 113 MMHG

## 2023-08-21 VITALS — DIASTOLIC BLOOD PRESSURE: 69 MMHG | SYSTOLIC BLOOD PRESSURE: 111 MMHG

## 2023-08-21 VITALS — DIASTOLIC BLOOD PRESSURE: 75 MMHG | SYSTOLIC BLOOD PRESSURE: 115 MMHG

## 2023-08-21 VITALS — SYSTOLIC BLOOD PRESSURE: 133 MMHG | DIASTOLIC BLOOD PRESSURE: 70 MMHG

## 2023-08-21 VITALS — SYSTOLIC BLOOD PRESSURE: 105 MMHG | DIASTOLIC BLOOD PRESSURE: 66 MMHG

## 2023-08-21 VITALS — DIASTOLIC BLOOD PRESSURE: 71 MMHG | SYSTOLIC BLOOD PRESSURE: 120 MMHG

## 2023-08-21 VITALS — SYSTOLIC BLOOD PRESSURE: 117 MMHG | DIASTOLIC BLOOD PRESSURE: 73 MMHG

## 2023-08-21 DIAGNOSIS — Z3A.18: ICD-10-CM

## 2023-08-21 DIAGNOSIS — O35.9XX0: ICD-10-CM

## 2023-08-21 DIAGNOSIS — Z28.310: ICD-10-CM

## 2023-08-21 DIAGNOSIS — O36.4XX0: Primary | ICD-10-CM

## 2023-08-21 DIAGNOSIS — Z37.1: ICD-10-CM

## 2023-08-21 DIAGNOSIS — O41.00X0: ICD-10-CM

## 2023-08-21 DIAGNOSIS — Z37.9: ICD-10-CM

## 2023-08-21 LAB
ALBUMIN SERPL-MCNC: 3.3 GM/DL (ref 3.2–4.5)
ALP SERPL-CCNC: 215 U/L (ref 40–136)
ALT SERPL-CCNC: 12 U/L (ref 0–55)
BASOPHILS # BLD AUTO: 0 10^3/UL (ref 0–0.1)
BASOPHILS NFR BLD AUTO: 0 % (ref 0–10)
BILIRUB SERPL-MCNC: 0.3 MG/DL (ref 0.1–1)
BUN/CREAT SERPL: 10
CALCIUM SERPL-MCNC: 8.7 MG/DL (ref 8.5–10.1)
CHLORIDE SERPL-SCNC: 111 MMOL/L (ref 98–107)
CO2 SERPL-SCNC: 19 MMOL/L (ref 21–32)
CREAT SERPL-MCNC: 0.5 MG/DL (ref 0.6–1.3)
EOSINOPHIL # BLD AUTO: 0.2 10^3/UL (ref 0–0.3)
EOSINOPHIL NFR BLD AUTO: 1 % (ref 0–10)
EOSINOPHIL NFR BLD MANUAL: 1 %
GFR SERPLBLD BASED ON 1.73 SQ M-ARVRAT: 137 ML/MIN
GLUCOSE SERPL-MCNC: 72 MG/DL (ref 70–105)
HCT VFR BLD CALC: 34 % (ref 35–52)
HGB BLD-MCNC: 12.6 G/DL (ref 11.5–16)
LYMPHOCYTES # BLD AUTO: 1.7 10^3/UL (ref 1–4)
LYMPHOCYTES NFR BLD AUTO: 11 % (ref 12–44)
MANUAL DIFFERENTIAL PERFORMED BLD QL: YES
MCH RBC QN AUTO: 30 PG (ref 25–34)
MCHC RBC AUTO-ENTMCNC: 37 G/DL (ref 32–36)
MCV RBC AUTO: 82 FL (ref 80–99)
MONOCYTES # BLD AUTO: 0.8 10^3/UL (ref 0–1)
MONOCYTES NFR BLD AUTO: 5 % (ref 0–12)
MONOCYTES NFR BLD: 3 %
NEUTROPHILS # BLD AUTO: 12.2 10^3/UL (ref 1.8–7.8)
NEUTROPHILS NFR BLD AUTO: 82 % (ref 42–75)
NEUTS BAND NFR BLD MANUAL: 84 %
PLATELET # BLD: 295 10^3/UL (ref 130–400)
PMV BLD AUTO: 9.8 FL (ref 9–12.2)
POTASSIUM SERPL-SCNC: 4 MMOL/L (ref 3.6–5)
PROT SERPL-MCNC: 6.3 GM/DL (ref 6.4–8.2)
RBC MORPH BLD: NORMAL
SODIUM SERPL-SCNC: 139 MMOL/L (ref 135–145)
VARIANT LYMPHS NFR BLD MANUAL: 12 %
WBC # BLD AUTO: 14.9 10^3/UL (ref 4.3–11)

## 2023-08-21 PROCEDURE — 76815 OB US LIMITED FETUS(S): CPT

## 2023-08-21 PROCEDURE — 86900 BLOOD TYPING SEROLOGIC ABO: CPT

## 2023-08-21 PROCEDURE — 85025 COMPLETE CBC W/AUTO DIFF WBC: CPT

## 2023-08-21 PROCEDURE — 85027 COMPLETE CBC AUTOMATED: CPT

## 2023-08-21 PROCEDURE — 99284 EMERGENCY DEPT VISIT MOD MDM: CPT

## 2023-08-21 PROCEDURE — 86850 RBC ANTIBODY SCREEN: CPT

## 2023-08-21 PROCEDURE — 36415 COLL VENOUS BLD VENIPUNCTURE: CPT

## 2023-08-21 PROCEDURE — 80053 COMPREHEN METABOLIC PANEL: CPT

## 2023-08-21 PROCEDURE — 85007 BL SMEAR W/DIFF WBC COUNT: CPT

## 2023-08-21 PROCEDURE — 86780 TREPONEMA PALLIDUM: CPT

## 2023-08-21 PROCEDURE — 86901 BLOOD TYPING SEROLOGIC RH(D): CPT

## 2023-08-21 RX ADMIN — SODIUM CHLORIDE, SODIUM LACTATE, POTASSIUM CHLORIDE, CALCIUM CHLORIDE, AND DEXTROSE MONOHYDRATE SCH MLS/HR: 600; 310; 30; 20; 5 INJECTION, SOLUTION INTRAVENOUS at 21:04

## 2023-08-21 RX ADMIN — SODIUM CHLORIDE, SODIUM LACTATE, POTASSIUM CHLORIDE, CALCIUM CHLORIDE, AND DEXTROSE MONOHYDRATE SCH MLS/HR: 600; 310; 30; 20; 5 INJECTION, SOLUTION INTRAVENOUS at 13:58

## 2023-08-21 RX ADMIN — HYDROMORPHONE HYDROCHLORIDE PRN MG: 2 INJECTION, SOLUTION INTRAMUSCULAR; INTRAVENOUS; SUBCUTANEOUS at 22:14

## 2023-08-21 RX ADMIN — Medication SCH ML: at 22:14

## 2023-08-21 RX ADMIN — Medication SCH ML: at 16:30

## 2023-08-21 NOTE — DIAGNOSTIC IMAGING REPORT
Indication: Vaginal bleeding.



There is a fetus in a cephalic presentation. No fetal heart tones

are detected consistent with fetal demise. Amniotic fluid volume

is very low with amniotic fluid index of 0.9. Placenta is

anterior. Cervical length is 2.9 cm.



IMPRESSION: Severe oligohydramnios with findings consistent with

fetal demise.



Dictated by: 



  Dictated on workstation # [id number]

## 2023-08-21 NOTE — ED GU-FEMALE
General


Chief Complaint:  OB > 20 WEEKS


Stated Complaint:  LOST MUCUS PLUG | 19 WEEKS PREGNANT


Nursing Triage Note:  


PT AMB TO RM 9 WITH C/O POSSIBLY LOSING MUCOUS PLUG THIS MORNING. PT HAS HX OF 


BLEEDING WHILE PREGNANT AND HAD US LAST WEEK


Source:  patient


Exam Limitations:  no limitations


 (DILIA COKER)





History of Present Illness


Date Seen by Provider:  Aug 21, 2023


Time Seen by Provider:  11:35


Initial Comments


23-year-old G2, P1 pregnant female at 18 weeks 3 days presents to the ER with 

reports of losing her mucous plug around 7 AM.  States that at 10 AM she went to

the bathroom and felt something coming out of her vagina.  Reports it is blue in

color.  Patient has had a lot of bleeding during this pregnancy.  She was seen 

here less than a week ago for vaginal bleeding as well.  She had an ultrasound 

on 2023 which showed a viable pregnancy but she had very low amniotic 

fluid.


 (DILIA COKER)





Allergies and Home Medications


Allergies


Coded Allergies:  


     No Known Drug Allergies (Unverified , 10/13/18)





Patient Home Medication List


Home Medication List Reviewed:  Yes


 (DILIA COKER)


Docusate Sodium (Dok) 100 Mg Capsule, 100 MG PO BID PRN for CONSTIPATION-1ST 

LINE


   Prescribed by: NILSON MARTI on 10/26/20 1224


Hydrocodone/Acetaminophen (Hydrocodone-Acetamin 5-325 mg) 1 Each Tablet, 1-2 TAB

PO Q6HR PRN for PAIN-MODERATE (5-7)


   Prescribed by: NILSON MARTI on 10/26/20 1224


Ibuprofen (Ibu) 600 Mg Tablet, 600 MG PO Q6HR


   Prescribed by: NILSON MARTI on 10/26/20 1224


Metronidazole (Metronidazole) 500 Mg Tablet, 500 MG PO BID


   Prescribed by: Dilia Santa on 23


Prenatal Vit W-Ca,Fe,FA(<1 mg) (Prenatal Vitamins) 1 Each Tablet, 1 EACH PO 

DAILY, (Reported)


   Entered as Reported by: SHWETA SENA on 20 1621





Review of Systems


Review of Systems


Constitutional:  see HPI


Expected Date of Delivery:  Gustavo 15, 2024


 (DILIA COKER)





Past Medical-Social-Family Hx


Patient Social History


Tobacco Use?:  No


Use of E-Cig and/or Vaping dev:  No


Substance use?:  No


Alcohol Use?:  No


Pt feels they are or have been:  No


 (DILIA COKER)





Immunizations Up To Date


Tetanus Booster (TDap):  Less than 5yrs


PED Vaccines UTD:  Yes


 (DILIA COKER)





Seasonal Allergies


Seasonal Allergies:  No


 (DILIA COKER)





Past Medical History


Surgery/Hospitalization HX:  





Surgeries:  Yes


 Section


Respiratory:  No


Cardiac:  No


Neurological:  No


Expected Date of Delivery:  Gustavo 15, 2024


Reproductive Disorders:  No


Genitourinary:  Yes


Kidney Stones


Gastrointestinal:  Yes (Cyst by liver/Gallbladder )


Pancreatitis


Musculoskeletal:  No


Endocrine:  No


HEENT:  No


Cancer:  No


Psychosocial:  Yes


Anxiety


Integumentary:  No


Blood Disorders:  No


Adverse Reaction/Blood Tranf:  No


 (DILIA COKER)





Family Medical History





Patient reports no known family medical history.


No Pertinent Family Hx, Heart Disease, Cancer, Diabetes


 (DILIA COKER)





Physical Exam


Vital Signs





Vital Signs - First Documented








 23





 11:32


 


Temp 36.2


 


Pulse 89


 


Resp 14


 


B/P (MAP) 126/82 (97)


 


Pulse Ox 98


 


O2 Delivery Room Air








 (CHRIS MCCLELLAN MD)


Vital Signs


Capillary Refill :  


 (DILIA COKER)


Height, Weight, BMI


Height: 5'5.00"


Weight: 175lbs. oz. [id number]; 29.00 BMI


Method:Estimated


General Appearance:  WD/WN, no apparent distress


Neck:  supple, normal inspection


Cardiovascular:  regular rate, rhythm


Respiratory:  lungs clear, normal breath sounds, no respiratory distress, no 

accessory muscle use


Pelvic:  other (Prolapsed umbilical cord)


Extremities:  normal range of motion, normal inspection


Neurologic/Psychiatric:  alert, normal mood/affect


Skin:  normal color, warm/dry (DILIA COKER)





Progress/Results/Core Measures


Suspected Sepsis


SIRS


Temperature: 


Pulse: 89 


Respiratory Rate: 14


 


Laboratory Tests


23 12:05: White Blood Count 14.9H


Blood Pressure 126 /82 


Mean: 97


 











Laboratory Tests


23 12:05: 


Creatinine 0.50L, Platelet Count 295, Total Bilirubin 0.3


 (DILIA COKER)





Results/Orders


Vital Signs/I&O











 23





 11:32


 


Temp 36.2


 


Pulse 89


 


Resp 14


 


B/P (MAP) 126/82 (97)


 


Pulse Ox 98


 


O2 Delivery Room Air








 (CHRIS MCCLELLAN MD)


Vital Signs/I&O


Capillary Refill :  


 (DILIA COKER)








Blood Pressure Mean:                    97








Progress Note :  


Progress Note


Patient seen and evaluated, resting comfortably in bed, no acute distress.  

Patient provided picture of what looks like a mucous plug that she lost this 

morning at 7 AM.  Prolapsed umbilical cord noted coming out of the vagina.  Dr. Marti, patient's OB, consulted. He recommends admission.  He would also like an

ultrasound for viability and basic labs.  Plan of care discussed with patient.  

Informed patient that this pregnancy will likely not be viable.  Patient 

verbalized understanding.


 (DILIA COKER)


Progress Note :  


Progress Note


Patient was seen and evaluated by me due to presenting cord on exam by nurse 

practitioner.  Patient is here with pregnancy of just over 18 weeks as confirmed

on ultrasound.  She did pass what looks like a mucous plug this morning and then

noticed another structure which appears to be vocal cord.  On exam, there is 

umbilical cord presenting on vaginal exam.  I do not see pulsations.  I did 

discuss this with Dr. MARTI and he is recommending admission to the women's 

health unit.  I did discuss with the patient that this would lead to an unviable

pregnancy for which she understood.  Admit, observation status to services under

care of Dr. MARTI, obstetrician.


 (CHRIS MCCLELLAN MD)





Departure


Communication (Admissions)


Time/Spoke to Admitting Phy:  11:50


Dr. Marti, OB.


 (DILIA COKER)





Impression





   Primary Impression:  


   Umbilical cord, prolapsed


Disposition:   ADMITTED AS INPATIENT


Condition:  Stable





Admissions


Decision to Admit Reason:  Admit from ER (General)


Decision to Admit/Date:  Aug 21, 2023


Time/Decision to Admit Time:  11:50


 (DILIA COKER)





Departure-Patient Inst.


Referrals:  


[facility] (PCP/Family)


Primary Care Physician











DILIA COKER          Aug 21, 2023 12:01


CHRIS MCCLELLAN MD          Aug 21, 2023 12:07

## 2023-08-21 NOTE — HISTORY & PHYSICAL-OB
OB - Chief Complaint & HPI


Date/Time


Date of Admission:


Date of Admission:  Aug 21, 2023 at 12:33


Date seen by a Provider:  Aug 21, 2023


Time Seen by a Provider:  12:30





Chief Complaint/History


OB-Reason for Admission/Chief:  


Hx :  2


Hx Para:  1


Expected Date of Delivery:  Gustavo 15, 2024


Gestational Age in Weeks:  18


Gestational Age in Days:  5


Other reason for admission:


Fetal demise at 18 weeks


Admission Nurse Assessment Rev:  Yes


History of Labs


see Prenatal labs





Allergies and Home Medications


Allergies


Coded Allergies:  


     No Known Drug Allergies (Unverified , 10/13/18)





Patient Home Medication List


Home Medication List Reviewed:  Yes


Docusate Sodium (Dok) 100 Mg Capsule, 100 MG PO BID PRN for CONSTIPATION-1ST 

LINE


   Prescribed by: NILSON MEDINA on 10/26/20 1224


Hydrocodone/Acetaminophen (Hydrocodone-Acetamin 5-325 mg) 1 Each Tablet, 1-2 TAB

PO Q6HR PRN for PAIN-MODERATE (5-7)


   Prescribed by: NILSON MEDINA on 10/26/20 1224


Ibuprofen (Ibu) 600 Mg Tablet, 600 MG PO Q6HR


   Prescribed by: NILSON MEDINA on 10/26/20 1224


Metronidazole (Metronidazole) 500 Mg Tablet, 500 MG PO BID


   Prescribed by: Dilia Santa on 23


Prenatal Vit W-Ca,Fe,FA(<1 mg) (Prenatal Vitamins) 1 Each Tablet, 1 EACH PO 

DAILY, (Reported)


   Entered as Reported by: SHWETA SENA on 20 1621





OB - History


Hx of Present Pregnancy


Prenatal Care:  Yes


Ultrasounds:  Abnormal US findings (anhydramnios at 18 week scan)


Obstetrical Complications:  Other (elevated AFP)


Medical Complications:  None





Delivery History


Hx Blood Disorders:  No


Adverse Rxn to Tranfusion:  No





Patient Past Medical History





n/a





Social History/Family History


2nd Hand Smoke Exposure:  No





Immunizations


Hepatitis B:  Yes


Tetanus Booster (TDap):  Less than 5yrs





OB - Admission Exam


Physical Exam


Vitals:





Vital Signs








 23





 12:27


 


Temp 36.2


 


Pulse 80


 


Resp 14


 


B/P (MAP) 124/77


 


Pulse Ox 98


 


O2 Delivery Room Air








HEENT:  NCAT


Abdomen:  Gravid


Labs





Laboratory Tests








Test


 23


12:05 Range/Units


 


 


White Blood Count


 14.9 H


 4.3-11.0


10^3/uL


 


Red Blood Count


 4.19 


 3.80-5.11


10^6/uL


 


Hemoglobin 12.6  11.5-16.0  g/dL


 


Hematocrit 34 L 35-52  %


 


Mean Corpuscular Volume 82  80-99  fL


 


Mean Corpuscular Hemoglobin 30  25-34  pg


 


Mean Corpuscular Hemoglobin


Concent 37 H


 32-36  g/dL





 


Red Cell Distribution Width 13.4  10.0-14.5  %


 


Platelet Count


 295 


 130-400


10^3/uL


 


Mean Platelet Volume 9.8  9.0-12.2  fL


 


Immature Granulocyte % (Auto) 0   %


 


Neutrophils (%) (Auto) 82 H 42-75  %


 


Lymphocytes (%) (Auto) 11 L 12-44  %


 


Monocytes (%) (Auto) 5  0-12  %


 


Eosinophils (%) (Auto) 1  0-10  %


 


Basophils (%) (Auto) 0  0-10  %


 


Neutrophils # (Auto)


 12.2 H


 1.8-7.8


10^3/uL


 


Lymphocytes # (Auto)


 1.7 


 1.0-4.0


10^3/uL


 


Monocytes # (Auto)


 0.8 


 0.0-1.0


10^3/uL


 


Eosinophils # (Auto)


 0.2 


 0.0-0.3


10^3/uL


 


Basophils # (Auto)


 0.0 


 0.0-0.1


10^3/uL


 


Immature Granulocyte # (Auto)


 0.1 


 0.0-0.1


10^3/uL


 


Neutrophils % (Manual) 84   %


 


Lymphocytes % (Manual) 12   %


 


Monocytes % (Manual) 3   %


 


Eosinophils % (Manual) 1   %


 


Blood Morphology Comment NORMAL   


 


Sodium Level 139  135-145  MMOL/L


 


Potassium Level 4.0  3.6-5.0  MMOL/L


 


Chloride Level 111 H   MMOL/L


 


Carbon Dioxide Level 19 L 21-32  MMOL/L


 


Anion Gap 9  5-14  MMOL/L


 


Blood Urea Nitrogen 5 L 7-18  MG/DL


 


Creatinine


 0.50 L


 0.60-1.30


MG/DL


 


Estimat Glomerular Filtration


Rate 137 


  





 


BUN/Creatinine Ratio 10   


 


Glucose Level 72    MG/DL


 


Calcium Level 8.7  8.5-10.1  MG/DL


 


Corrected Calcium 9.3  8.5-10.1  MG/DL


 


Total Bilirubin 0.3  0.1-1.0  MG/DL


 


Aspartate Amino Transf


(AST/SGOT) 12 


 5-34  U/L





 


Alanine Aminotransferase


(ALT/SGPT) 12 


 0-55  U/L





 


Alkaline Phosphatase 215 H   U/L


 


Total Protein 6.3 L 6.4-8.2  GM/DL


 


Albumin 3.3  3.2-4.5  GM/DL











OB - Assessment/Plan/Diagnosis


Assessment


Admission Dx


22 yo  @ 18 weeks


IUFD


Anhydramnios


Elevated AFP on quad screen


Admission Status:  Inpatient Order (span 2 midnights)


Reason for Inpatient Admission:  


IUFD at 18 weeks





Plan


Plan:  Other (Pitocin augmentation due to history of  and 

contraindication for use of prostagladin agent)











NILSON MEDINA DO            Aug 21, 2023 13:01

## 2023-08-22 VITALS — DIASTOLIC BLOOD PRESSURE: 67 MMHG | SYSTOLIC BLOOD PRESSURE: 107 MMHG

## 2023-08-22 VITALS — DIASTOLIC BLOOD PRESSURE: 72 MMHG | SYSTOLIC BLOOD PRESSURE: 118 MMHG

## 2023-08-22 VITALS — SYSTOLIC BLOOD PRESSURE: 115 MMHG | DIASTOLIC BLOOD PRESSURE: 58 MMHG

## 2023-08-22 VITALS — SYSTOLIC BLOOD PRESSURE: 118 MMHG | DIASTOLIC BLOOD PRESSURE: 61 MMHG

## 2023-08-22 VITALS — DIASTOLIC BLOOD PRESSURE: 51 MMHG | SYSTOLIC BLOOD PRESSURE: 92 MMHG

## 2023-08-22 VITALS — DIASTOLIC BLOOD PRESSURE: 61 MMHG | SYSTOLIC BLOOD PRESSURE: 110 MMHG

## 2023-08-22 VITALS — SYSTOLIC BLOOD PRESSURE: 114 MMHG | DIASTOLIC BLOOD PRESSURE: 65 MMHG

## 2023-08-22 VITALS — DIASTOLIC BLOOD PRESSURE: 56 MMHG | SYSTOLIC BLOOD PRESSURE: 111 MMHG

## 2023-08-22 VITALS — DIASTOLIC BLOOD PRESSURE: 65 MMHG | SYSTOLIC BLOOD PRESSURE: 108 MMHG

## 2023-08-22 VITALS — DIASTOLIC BLOOD PRESSURE: 77 MMHG | SYSTOLIC BLOOD PRESSURE: 128 MMHG

## 2023-08-22 VITALS — SYSTOLIC BLOOD PRESSURE: 129 MMHG | DIASTOLIC BLOOD PRESSURE: 76 MMHG

## 2023-08-22 VITALS — DIASTOLIC BLOOD PRESSURE: 70 MMHG | SYSTOLIC BLOOD PRESSURE: 119 MMHG

## 2023-08-22 VITALS — DIASTOLIC BLOOD PRESSURE: 67 MMHG | SYSTOLIC BLOOD PRESSURE: 121 MMHG

## 2023-08-22 VITALS — DIASTOLIC BLOOD PRESSURE: 67 MMHG | SYSTOLIC BLOOD PRESSURE: 103 MMHG

## 2023-08-22 VITALS — SYSTOLIC BLOOD PRESSURE: 119 MMHG | DIASTOLIC BLOOD PRESSURE: 59 MMHG

## 2023-08-22 VITALS — SYSTOLIC BLOOD PRESSURE: 124 MMHG | DIASTOLIC BLOOD PRESSURE: 68 MMHG

## 2023-08-22 VITALS — DIASTOLIC BLOOD PRESSURE: 71 MMHG | SYSTOLIC BLOOD PRESSURE: 132 MMHG

## 2023-08-22 VITALS — DIASTOLIC BLOOD PRESSURE: 73 MMHG | SYSTOLIC BLOOD PRESSURE: 133 MMHG

## 2023-08-22 VITALS — DIASTOLIC BLOOD PRESSURE: 79 MMHG | SYSTOLIC BLOOD PRESSURE: 118 MMHG

## 2023-08-22 VITALS — DIASTOLIC BLOOD PRESSURE: 72 MMHG | SYSTOLIC BLOOD PRESSURE: 119 MMHG

## 2023-08-22 VITALS — SYSTOLIC BLOOD PRESSURE: 121 MMHG | DIASTOLIC BLOOD PRESSURE: 82 MMHG

## 2023-08-22 VITALS — DIASTOLIC BLOOD PRESSURE: 61 MMHG | SYSTOLIC BLOOD PRESSURE: 126 MMHG

## 2023-08-22 VITALS — SYSTOLIC BLOOD PRESSURE: 114 MMHG | DIASTOLIC BLOOD PRESSURE: 61 MMHG

## 2023-08-22 VITALS — DIASTOLIC BLOOD PRESSURE: 61 MMHG | SYSTOLIC BLOOD PRESSURE: 113 MMHG

## 2023-08-22 VITALS — DIASTOLIC BLOOD PRESSURE: 77 MMHG | SYSTOLIC BLOOD PRESSURE: 133 MMHG

## 2023-08-22 VITALS — SYSTOLIC BLOOD PRESSURE: 134 MMHG | DIASTOLIC BLOOD PRESSURE: 75 MMHG

## 2023-08-22 VITALS — DIASTOLIC BLOOD PRESSURE: 83 MMHG | SYSTOLIC BLOOD PRESSURE: 134 MMHG

## 2023-08-22 VITALS — SYSTOLIC BLOOD PRESSURE: 113 MMHG | DIASTOLIC BLOOD PRESSURE: 74 MMHG

## 2023-08-22 VITALS — DIASTOLIC BLOOD PRESSURE: 56 MMHG | SYSTOLIC BLOOD PRESSURE: 114 MMHG

## 2023-08-22 VITALS — DIASTOLIC BLOOD PRESSURE: 63 MMHG | SYSTOLIC BLOOD PRESSURE: 124 MMHG

## 2023-08-22 VITALS — DIASTOLIC BLOOD PRESSURE: 67 MMHG | SYSTOLIC BLOOD PRESSURE: 124 MMHG

## 2023-08-22 VITALS — SYSTOLIC BLOOD PRESSURE: 106 MMHG | DIASTOLIC BLOOD PRESSURE: 57 MMHG

## 2023-08-22 VITALS — DIASTOLIC BLOOD PRESSURE: 64 MMHG | SYSTOLIC BLOOD PRESSURE: 116 MMHG

## 2023-08-22 VITALS — DIASTOLIC BLOOD PRESSURE: 64 MMHG | SYSTOLIC BLOOD PRESSURE: 104 MMHG

## 2023-08-22 LAB
BASOPHILS # BLD AUTO: 0 10^3/UL (ref 0–0.1)
BASOPHILS NFR BLD AUTO: 0 % (ref 0–10)
EOSINOPHIL # BLD AUTO: 0.1 10^3/UL (ref 0–0.3)
EOSINOPHIL NFR BLD AUTO: 0 % (ref 0–10)
HCT VFR BLD CALC: 30 % (ref 35–52)
HGB BLD-MCNC: 11 G/DL (ref 11.5–16)
LYMPHOCYTES # BLD AUTO: 1.9 10^3/UL (ref 1–4)
LYMPHOCYTES NFR BLD AUTO: 12 % (ref 12–44)
MANUAL DIFFERENTIAL PERFORMED BLD QL: NO
MCH RBC QN AUTO: 30 PG (ref 25–34)
MCHC RBC AUTO-ENTMCNC: 37 G/DL (ref 32–36)
MCV RBC AUTO: 82 FL (ref 80–99)
MONOCYTES # BLD AUTO: 0.7 10^3/UL (ref 0–1)
MONOCYTES NFR BLD AUTO: 4 % (ref 0–12)
NEUTROPHILS # BLD AUTO: 13.5 10^3/UL (ref 1.8–7.8)
NEUTROPHILS NFR BLD AUTO: 83 % (ref 42–75)
PLATELET # BLD: 289 10^3/UL (ref 130–400)
PMV BLD AUTO: 9.9 FL (ref 9–12.2)
WBC # BLD AUTO: 16.2 10^3/UL (ref 4.3–11)

## 2023-08-22 RX ADMIN — Medication SCH MLS/HR: at 04:13

## 2023-08-22 RX ADMIN — Medication SCH MLS/HR: at 04:00

## 2023-08-22 RX ADMIN — HYDROMORPHONE HYDROCHLORIDE PRN MG: 2 INJECTION, SOLUTION INTRAMUSCULAR; INTRAVENOUS; SUBCUTANEOUS at 00:48

## 2023-08-22 NOTE — OB LABOR & DELIVERY RECORD
L&D History


Date of Service


Date of Service:  Aug 22, 2023





History


Expected Date of Delivery:  Gustavo 15, 2024


Gestational Age in Weeks:  18


Hx :  2


Hx Para:  1





Pregnancy Complications


Prenatal Events:  Oliohydramnios (IUFD)


Patient admitted at 18 weeks with IUFD and anhydramnios.


Operative Indications (Cesarea:  N/A-Vaginal Delivery


Intrapartal Events:  None





L&D Stage1


Stage One


Onset of Labor - Date:  Aug 22, 2023





Monitors and Tracing


Fetal Monitor Mode:  None


Fetal Heart Rate:  0


Fetal Monitor Accelerations:  None


Fetal Station:  -2


Vital Signs





                          VS - Last 72 Hours, by Label








 23





 11:32 12:27 13:10 14:15


 


Temp 36.2 36.2 37.0 


 


Pulse 89 80 86 84


 


Resp 14 14 20 20


 


B/P (MAP) 126/82 (97) 124/77 128/79 (95) 119/56 (77)


 


Pulse Ox 98 98 99 100


 


O2 Delivery Room Air Room Air Room Air Room Air


 


    





 23





 14:30 14:45 15:15 15:21


 


Temp    37.0


 


Pulse 74 74 68 84


 


Resp 20 20 20 20


 


B/P (MAP) 123/57 (79) 123/57 (79) 128/74 (92) 


 


Pulse Ox 100 100 100 100


 


O2 Delivery Room Air Room Air Room Air Room Air


 


    





 23





 15:30 15:45 16:00 16:15


 


Temp  37.1  


 


Pulse 68 92 82 82


 


Resp 20 20 20 20


 


B/P (MAP) 128/74 (92) 110/74 (86) 114/77 (89) 114/77 (89)


 


Pulse Ox 100 99 100 100


 


O2 Delivery Room Air Room Air Room Air Room Air


 


    





 23





 16:30 16:45 17:00 17:15


 


Pulse 91 79 79 94


 


Resp 20 20 20 20


 


B/P (MAP) 111/68 (82) 113/73 (86) 113/73 (86) 111/69 (83)


 


Pulse Ox 100 100 100 100


 


O2 Delivery Room Air Room Air Room Air Room Air





 23





 17:30 17:45 18:00 18:15


 


Temp   37.1 


 


Pulse 76 74 84 69


 


Resp 20 20 20 20


 


B/P (MAP) 103/65 (78) 117/73 (88) 133/70 (91) 119/67 (84)


 


Pulse Ox 100 100 100 99


 


O2 Delivery Room Air Room Air Room Air Room Air


 


    





 23





 18:30 18:45 19:08 21:01


 


Temp    36.8


 


Pulse 83 64 74 88


 


Resp 20 20 20 18


 


B/P (MAP) 120/72 (88) 120/71 (87)  123/76 (92)


 


Pulse Ox 99 99 99 100


 


O2 Delivery Room Air Room Air Room Air Room Air


 


    





 23





 22:19 22:34 22:49 23:04


 


Pulse 92 73 63 90


 


Resp 18   


 


B/P (MAP) 115/75 (88) 118/77 (91) 114/76 (89) 105/66 (79)


 


Pulse Ox 99 98 97 95


 


O2 Delivery Room Air Room Air Room Air Room Air





 23





 23:19 23:34 23:49 00:19


 


Pulse 73 61 62 81


 


B/P (MAP) 93/54 (67) 98/53 (68) 92/53 (66) 118/79 (92)


 


Pulse Ox 98 98 99 


 


O2 Delivery Room Air Room Air Room Air 





 23





 00:20 00:55 01:04 01:19


 


Temp 37.0   


 


Pulse  90 86 80


 


B/P (MAP)  113/74 (87) 119/70 (86) 119/72 (88)


 


Pulse Ox  99 97 100


 


O2 Delivery  Room Air Room Air Room Air


 


    





 23





 01:34 01:49 01:55 01:58


 


Pulse 82 90 84 78


 


B/P (MAP) 121/82 (95) 118/72 (87) 129/76 (93) 134/83 (100)


 


Pulse Ox   98 100


 


O2 Delivery   Room Air Room Air





 23





 02:01 02:04 02:09 02:10


 


Pulse 74 71 122 107


 


B/P (MAP) 128/77 (94) 132/71 (91) 133/77 (95) 133/73 (93)





 23





 02:13 02:16 02:19 02:22


 


Pulse 81 83 97 87


 


B/P (MAP) 134/75 (94) 124/68 (86) 124/67 (86) 124/63 (83)


 


Pulse Ox 99  100 


 


O2 Delivery Room Air  Room Air 





 23





 02:25 02:28 02:31 02:34


 


Pulse 85 74 72 78


 


B/P (MAP) 126/61 (82) 114/56 (75) 119/59 (79) 118/61 (80)


 


Pulse Ox 100 100  100


 


O2 Delivery Room Air Room Air  Room Air





 23





 02:37 02:40 02:58 03:13


 


Pulse 63 71 62 66


 


B/P (MAP) 115/58 (77) 113/61 (78) 106/57 (73) 110/61 (77)


 


Pulse Ox  99 100 100


 


O2 Delivery  Room Air Room Air Room Air





 23





 03:28 03:43 03:49 03:58


 


Pulse 99 72 85 63


 


B/P (MAP) 116/64 (81) 114/65 (81) 114/61 (78) 104/64 (77)


 


Pulse Ox 100 100 100 100


 


O2 Delivery Room Air Room Air Room Air Room Air





 23 





 04:13 04:28 04:43 


 


Temp   37.4 


 


Pulse 83 61 67 


 


Resp   18 


 


B/P (MAP) 108/65 (79) 107/67 (80) 103/67 (79) 


 


Pulse Ox 99 100 99 


 


O2 Delivery Room Air Room Air Room Air 











Induction/Anesthesia


Epidural Cath Placement - Time:  0202





Progress/Notes


Patient made comfortable with IV pain meds, then received an epidural due to 

pain.  Pitocin augmentation utilitzed to progress to complete, which due to size

was approx 3-5 cm.





L&D Stage2


Monitors and Tracing


Fetal Monitor Mode:  None


Fetal Heart Rate:  0





Cord Descript/Complications


Complications


fetus came out intact with placenta approx 330 am.





Condition of Infant


Condition of Infant


 fetus noted, intact with no gross abnormalities, cord appears grossly 

normal.  Possible small subchorionic bleed noted of placenta





L&D Stage3


Pictocin


Pitocin Administration mu/min:  25


Pitocin ml/hr:  999


Pitocin Administration Comment:  


Pitocin increased to 999 ml/hr per order from Dr. Medina





Placenta Delivery


Placenta Delivery:  Spontaneous





Delivery Summary


Summary


Estimated blood loss (mL):  250


Attending at delivery:


Nilson Medina DO





Condition of Delivery


Post Partum Hemorrhage:  No











NILSON MEDINA DO            Aug 22, 2023 05:28

## 2023-08-22 NOTE — ANESTHESIA-REGIONAL POST-OP
Regional


Patient Condition


Mental Status:  Alert, Oriented x3


Circulation:  Same as Pre-Op


Headache:  Absent


Sensation:  Full Recovery


Motor Block:  Absent





Post Op Complications


Complications


None





Follow Up Care/Instructions


Patient Instructions


None needed.





Anesthesia/Patient Condition


Patient is doing well, no complaints, stable vital signs, no apparent adverse 

anesthesia problems.   


No complications reported per nursing.











JOHAN HUANG CRNA         Aug 22, 2023 14:26 Retention Suture Bite Size: 3 mm